# Patient Record
Sex: FEMALE
[De-identification: names, ages, dates, MRNs, and addresses within clinical notes are randomized per-mention and may not be internally consistent; named-entity substitution may affect disease eponyms.]

---

## 2022-07-21 PROBLEM — Z00.00 ENCOUNTER FOR PREVENTIVE HEALTH EXAMINATION: Status: ACTIVE | Noted: 2022-07-21

## 2022-07-22 ENCOUNTER — APPOINTMENT (OUTPATIENT)
Dept: UROLOGY | Facility: CLINIC | Age: 57
End: 2022-07-22

## 2022-07-22 VITALS
BODY MASS INDEX: 33.32 KG/M2 | OXYGEN SATURATION: 96 % | DIASTOLIC BLOOD PRESSURE: 88 MMHG | HEIGHT: 65 IN | WEIGHT: 200 LBS | HEART RATE: 75 BPM | SYSTOLIC BLOOD PRESSURE: 119 MMHG | TEMPERATURE: 97.8 F

## 2022-07-22 DIAGNOSIS — N32.81 OVERACTIVE BLADDER: ICD-10-CM

## 2022-07-22 DIAGNOSIS — Z87.442 PERSONAL HISTORY OF URINARY CALCULI: ICD-10-CM

## 2022-07-22 DIAGNOSIS — Z87.09 PERSONAL HISTORY OF OTHER DISEASES OF THE RESPIRATORY SYSTEM: ICD-10-CM

## 2022-07-22 DIAGNOSIS — R10.9 UNSPECIFIED ABDOMINAL PAIN: ICD-10-CM

## 2022-07-22 DIAGNOSIS — Z80.0 FAMILY HISTORY OF MALIGNANT NEOPLASM OF DIGESTIVE ORGANS: ICD-10-CM

## 2022-07-22 DIAGNOSIS — Z87.19 PERSONAL HISTORY OF OTHER DISEASES OF THE DIGESTIVE SYSTEM: ICD-10-CM

## 2022-07-22 DIAGNOSIS — K90.0 CELIAC DISEASE: ICD-10-CM

## 2022-07-22 DIAGNOSIS — Z87.11 PERSONAL HISTORY OF PEPTIC ULCER DISEASE: ICD-10-CM

## 2022-07-22 LAB
BILIRUB UR QL STRIP: NEGATIVE
COLLECTION METHOD: NORMAL
GLUCOSE UR-MCNC: NEGATIVE
HCG UR QL: 0.2 EU/DL
HGB UR QL STRIP.AUTO: NORMAL
KETONES UR-MCNC: NEGATIVE
LEUKOCYTE ESTERASE UR QL STRIP: NEGATIVE
NITRITE UR QL STRIP: NEGATIVE
PH UR STRIP: 5.5
PROT UR STRIP-MCNC: NEGATIVE
SP GR UR STRIP: 1.02

## 2022-07-22 PROCEDURE — 99204 OFFICE O/P NEW MOD 45 MIN: CPT

## 2022-07-22 PROCEDURE — 81003 URINALYSIS AUTO W/O SCOPE: CPT | Mod: QW

## 2022-07-22 RX ORDER — MOMETASONE FUROATE 1 MG/G
0.1 OINTMENT TOPICAL
Qty: 45 | Refills: 0 | Status: ACTIVE | COMMUNITY
Start: 2022-05-18

## 2022-07-22 RX ORDER — OXYBUTYNIN CHLORIDE 10 MG/1
10 TABLET, EXTENDED RELEASE ORAL
Qty: 30 | Refills: 5 | Status: ACTIVE | COMMUNITY
Start: 2022-07-22 | End: 1900-01-01

## 2022-07-22 NOTE — HISTORY OF PRESENT ILLNESS
[FreeTextEntry1] : 56 yo female with hx of kidney stones (2004, spontaneously passed) presents with 2 weeks of increased flank discomfort bilaterally.  She also notes an increase in urinary frequency with mild left inguinal discomfort with voiding.  She has baseline OAB for which she has been taking 5 mg of oxybutynin daily for several years.  This has decrease nocturia, urgency, frequency, and has eliminated urge incontinence, but she still has occasional urgency and nocturia 2-3 times at night.  She denies dysuria or hematuria.  She denies fevers or chills.  \par \par She had a CT scan in June 2021 which showed no stone or hydronephrosis.  A NM renal scan was obtained at that time as well, which showed some right hydronephrosis but there was prompt drainage of urine after lasix administration.  The split function was 58% left and 42% right.

## 2022-07-22 NOTE — ASSESSMENT
[FreeTextEntry1] : 56 yo female with hx of kidney stones and ow several weeks of increased flank discomfort.\par \par The patient was seen and examined and results were reviewed. \par \par Kidney stone disease was reviewed with the patient and etiologies/risk factors were discussed. Preventative measures were discussed including hydration, diet modification, and medications.Options for management were reviewed including conservative management (no intervention), medical expulsive therapy, and surgical management. Merits and limitations of each option was discussed. \par \par The role of imaging studies including ultrasound, plain film x-rays, and CT scan were discussed, as well as potential radiation exposure risks. Metabolic evaluation with serum chemistry and 24 hour urine collections were presented for the purpose of determining the etiology of the patient's stone forming risk factors.\par \par We will proceed with low dose CT stone protocol to assess for any current stone burden.\par \par We also discussed her hx of OAB.  She still has bothersome voiding symptoms despite being on 5 mg of oxybutynin.  I suggested we increase the dose of oxybutynin to 10 mg daily.  She is amenable to this plan.\par \par Plan:\par 1. CT stone protocol\par 2. UA and UCx\par 3. Oxybutynin 10 mg\par 4. F/u after # 1 and 2\par

## 2022-07-25 LAB
APPEARANCE: CLEAR
BACTERIA UR CULT: NORMAL
BACTERIA: NEGATIVE
BILIRUBIN URINE: NEGATIVE
BLOOD URINE: NEGATIVE
COLOR: YELLOW
GLUCOSE QUALITATIVE U: NEGATIVE
HYALINE CASTS: 3 /LPF
KETONES URINE: NEGATIVE
LEUKOCYTE ESTERASE URINE: NEGATIVE
MICROSCOPIC-UA: NORMAL
NITRITE URINE: NEGATIVE
PH URINE: 6
PROTEIN URINE: NEGATIVE
RED BLOOD CELLS URINE: 2 /HPF
SPECIFIC GRAVITY URINE: 1.02
SQUAMOUS EPITHELIAL CELLS: 3 /HPF
UROBILINOGEN URINE: NORMAL
WHITE BLOOD CELLS URINE: 1 /HPF

## 2022-08-08 ENCOUNTER — NON-APPOINTMENT (OUTPATIENT)
Age: 57
End: 2022-08-08